# Patient Record
Sex: MALE | Race: WHITE | NOT HISPANIC OR LATINO | Employment: FULL TIME | URBAN - NONMETROPOLITAN AREA
[De-identification: names, ages, dates, MRNs, and addresses within clinical notes are randomized per-mention and may not be internally consistent; named-entity substitution may affect disease eponyms.]

---

## 2021-04-22 ENCOUNTER — IMMUNIZATION (OUTPATIENT)
Dept: VACCINE CLINIC | Facility: HOSPITAL | Age: 21
End: 2021-04-22

## 2021-04-22 PROCEDURE — 91300 HC SARSCOV02 VAC 30MCG/0.3ML IM: CPT | Performed by: INTERNAL MEDICINE

## 2021-04-22 PROCEDURE — 0001A: CPT | Performed by: INTERNAL MEDICINE

## 2021-05-13 ENCOUNTER — IMMUNIZATION (OUTPATIENT)
Dept: VACCINE CLINIC | Facility: HOSPITAL | Age: 21
End: 2021-05-13

## 2021-05-13 PROCEDURE — 91300 HC SARSCOV02 VAC 30MCG/0.3ML IM: CPT | Performed by: INTERNAL MEDICINE

## 2021-05-13 PROCEDURE — 0002A: CPT | Performed by: INTERNAL MEDICINE

## 2023-05-10 ENCOUNTER — OFFICE VISIT (OUTPATIENT)
Dept: FAMILY MEDICINE CLINIC | Facility: CLINIC | Age: 23
End: 2023-05-10
Payer: COMMERCIAL

## 2023-05-10 ENCOUNTER — LAB (OUTPATIENT)
Dept: LAB | Facility: HOSPITAL | Age: 23
End: 2023-05-10
Payer: COMMERCIAL

## 2023-05-10 ENCOUNTER — TELEPHONE (OUTPATIENT)
Dept: FAMILY MEDICINE CLINIC | Facility: CLINIC | Age: 23
End: 2023-05-10

## 2023-05-10 VITALS
SYSTOLIC BLOOD PRESSURE: 120 MMHG | HEIGHT: 71 IN | WEIGHT: 210.2 LBS | DIASTOLIC BLOOD PRESSURE: 82 MMHG | BODY MASS INDEX: 29.43 KG/M2 | OXYGEN SATURATION: 97 % | HEART RATE: 89 BPM

## 2023-05-10 DIAGNOSIS — Z13.1 SCREENING FOR DIABETES MELLITUS: ICD-10-CM

## 2023-05-10 DIAGNOSIS — Z00.00 PHYSICAL EXAM, ANNUAL: ICD-10-CM

## 2023-05-10 DIAGNOSIS — L65.9 HAIR LOSS: ICD-10-CM

## 2023-05-10 DIAGNOSIS — Z13.0 SCREENING FOR DEFICIENCY ANEMIA: ICD-10-CM

## 2023-05-10 DIAGNOSIS — Z11.59 ENCOUNTER FOR HEPATITIS C SCREENING TEST FOR LOW RISK PATIENT: ICD-10-CM

## 2023-05-10 DIAGNOSIS — Z76.89 ENCOUNTER TO ESTABLISH CARE: Primary | ICD-10-CM

## 2023-05-10 DIAGNOSIS — E05.90 HYPERTHYROIDISM: Primary | ICD-10-CM

## 2023-05-10 DIAGNOSIS — Z13.29 SCREENING FOR THYROID DISORDER: ICD-10-CM

## 2023-05-10 DIAGNOSIS — R23.2 HOT FLASHES: ICD-10-CM

## 2023-05-10 DIAGNOSIS — E05.90 HYPERTHYROIDISM: ICD-10-CM

## 2023-05-10 LAB
ALBUMIN SERPL-MCNC: 4.5 G/DL (ref 3.5–5.2)
ALBUMIN/GLOB SERPL: 1.7 G/DL
ALP SERPL-CCNC: 120 U/L (ref 39–117)
ALT SERPL W P-5'-P-CCNC: 27 U/L (ref 1–41)
ANION GAP SERPL CALCULATED.3IONS-SCNC: 9.3 MMOL/L (ref 5–15)
AST SERPL-CCNC: 23 U/L (ref 1–40)
BASOPHILS # BLD AUTO: 0.03 10*3/MM3 (ref 0–0.2)
BASOPHILS NFR BLD AUTO: 0.5 % (ref 0–1.5)
BILIRUB SERPL-MCNC: 0.5 MG/DL (ref 0–1.2)
BUN SERPL-MCNC: 10 MG/DL (ref 6–20)
BUN/CREAT SERPL: 14.3 (ref 7–25)
CALCIUM SPEC-SCNC: 10.4 MG/DL (ref 8.6–10.5)
CHLORIDE SERPL-SCNC: 109 MMOL/L (ref 98–107)
CO2 SERPL-SCNC: 24.7 MMOL/L (ref 22–29)
CREAT SERPL-MCNC: 0.7 MG/DL (ref 0.76–1.27)
DEPRECATED RDW RBC AUTO: 37.2 FL (ref 37–54)
EGFRCR SERPLBLD CKD-EPI 2021: 132 ML/MIN/1.73
EOSINOPHIL # BLD AUTO: 0.33 10*3/MM3 (ref 0–0.4)
EOSINOPHIL NFR BLD AUTO: 5.6 % (ref 0.3–6.2)
ERYTHROCYTE [DISTWIDTH] IN BLOOD BY AUTOMATED COUNT: 13 % (ref 12.3–15.4)
GLOBULIN UR ELPH-MCNC: 2.7 GM/DL
GLUCOSE SERPL-MCNC: 112 MG/DL (ref 65–99)
HCT VFR BLD AUTO: 49.6 % (ref 37.5–51)
HCV AB SER DONR QL: NORMAL
HGB BLD-MCNC: 17.4 G/DL (ref 13–17.7)
IMM GRANULOCYTES # BLD AUTO: 0.01 10*3/MM3 (ref 0–0.05)
IMM GRANULOCYTES NFR BLD AUTO: 0.2 % (ref 0–0.5)
LYMPHOCYTES # BLD AUTO: 2.85 10*3/MM3 (ref 0.7–3.1)
LYMPHOCYTES NFR BLD AUTO: 48.6 % (ref 19.6–45.3)
MCH RBC QN AUTO: 27.8 PG (ref 26.6–33)
MCHC RBC AUTO-ENTMCNC: 35.1 G/DL (ref 31.5–35.7)
MCV RBC AUTO: 79.4 FL (ref 79–97)
MONOCYTES # BLD AUTO: 0.6 10*3/MM3 (ref 0.1–0.9)
MONOCYTES NFR BLD AUTO: 10.2 % (ref 5–12)
NEUTROPHILS NFR BLD AUTO: 2.04 10*3/MM3 (ref 1.7–7)
NEUTROPHILS NFR BLD AUTO: 34.9 % (ref 42.7–76)
NRBC BLD AUTO-RTO: 0 /100 WBC (ref 0–0.2)
PLATELET # BLD AUTO: 313 10*3/MM3 (ref 140–450)
PMV BLD AUTO: 10.2 FL (ref 6–12)
POTASSIUM SERPL-SCNC: 4.8 MMOL/L (ref 3.5–5.2)
PROT SERPL-MCNC: 7.2 G/DL (ref 6–8.5)
RBC # BLD AUTO: 6.25 10*6/MM3 (ref 4.14–5.8)
SODIUM SERPL-SCNC: 143 MMOL/L (ref 136–145)
T3FREE SERPL-MCNC: 16.6 PG/ML (ref 2–4.4)
T4 FREE SERPL-MCNC: 4.8 NG/DL (ref 0.93–1.7)
TSH SERPL DL<=0.05 MIU/L-ACNC: <0.005 UIU/ML (ref 0.27–4.2)
WBC NRBC COR # BLD: 5.86 10*3/MM3 (ref 3.4–10.8)

## 2023-05-10 PROCEDURE — 84481 FREE ASSAY (FT-3): CPT | Performed by: PHYSICIAN ASSISTANT

## 2023-05-10 PROCEDURE — 84439 ASSAY OF FREE THYROXINE: CPT | Performed by: PHYSICIAN ASSISTANT

## 2023-05-10 PROCEDURE — 80050 GENERAL HEALTH PANEL: CPT | Performed by: PHYSICIAN ASSISTANT

## 2023-05-10 PROCEDURE — 36415 COLL VENOUS BLD VENIPUNCTURE: CPT | Performed by: PHYSICIAN ASSISTANT

## 2023-05-10 PROCEDURE — 86803 HEPATITIS C AB TEST: CPT | Performed by: PHYSICIAN ASSISTANT

## 2023-05-10 NOTE — PROGRESS NOTES
Chief Complaint   Patient presents with   • Thyroid Problem     Pt states he has a history of thyroids in his family and he wants to get checked. He states he has had hot flashes for a few years and hair loss past few years.       Brady Roger is a 22 y.o. male who presents to [facility].  Patient has not been to a primary care in years.  He is concerned about his thyroid.  Both of his parents have thyroid disorder.    Patient reports hot flashes daily.  Started a few years ago.  Not necessarily getting worse.  Hair loss.  Sometimes has constipation.  No weight gain/loss.  No depression/anxiety.  Has sweating.  No lack of libido or ED. patient is not taking any medications or supplements.      Chief Complaint   Patient presents with   • Thyroid Problem     Pt states he has a history of thyroids in his family and he wants to get checked. He states he has had hot flashes for a few years and hair loss past few years.       Past Medical History:   Diagnosis Date   • Allergic 1 years old    penicillin   • Visual impairment 8 years old       Past Surgical History:   Procedure Laterality Date   • WISDOM TOOTH EXTRACTION         Family History   Problem Relation Age of Onset   • Thyroid disease Mother    • Thyroid disease Father    • Colon cancer Neg Hx    • Prostate cancer Neg Hx        Social History     Socioeconomic History   • Marital status: Single   Tobacco Use   • Smoking status: Former     Packs/day: 0.25     Years: 3.00     Pack years: 0.75     Types: Cigarettes, Electronic Cigarette     Quit date: 2020     Years since quittin.9   • Smokeless tobacco: Never   Substance and Sexual Activity   • Alcohol use: Yes     Alcohol/week: 2.0 standard drinks     Types: 2 Glasses of wine per week   • Drug use: Never   • Sexual activity: Yes     Partners: Female     Birth control/protection: Birth control pill       Allergies   Allergen Reactions   • Penicillins Hives     PCN family       ROS    Review of  "Systems   Constitutional: Positive for fatigue. Negative for activity change, appetite change, chills, diaphoresis, fever, unexpected weight gain and unexpected weight loss.   HENT: Negative for congestion, dental problem, ear pain, hearing loss, nosebleeds, sinus pressure, sore throat and trouble swallowing.    Eyes: Negative for blurred vision, pain, redness and visual disturbance.   Respiratory: Negative for apnea, cough, chest tightness, shortness of breath and wheezing.    Cardiovascular: Negative for chest pain, palpitations and leg swelling.   Gastrointestinal: Positive for constipation. Negative for abdominal distention, abdominal pain, anal bleeding, blood in stool, diarrhea, nausea, vomiting, GERD and indigestion.   Endocrine: Positive for heat intolerance. Negative for cold intolerance, polydipsia, polyphagia and polyuria.   Genitourinary: Negative for decreased urine volume, difficulty urinating, dysuria, frequency, hematuria, urgency and urinary incontinence.   Musculoskeletal: Negative for gait problem, joint swelling and bursitis.   Skin: Negative for dry skin, rash, skin lesions and wound.   Neurological: Negative for dizziness, tremors, seizures, syncope, speech difficulty, weakness, light-headedness, headache, memory problem and confusion.   Hematological: Does not bruise/bleed easily.   Psychiatric/Behavioral: Positive for stress. Negative for agitation, behavioral problems, decreased concentration, hallucinations, sleep disturbance, suicidal ideas and depressed mood. The patient is not nervous/anxious.        Vitals:    05/10/23 1025   BP: 120/82   Pulse: 89   SpO2: 97%   Weight: 95.3 kg (210 lb 3.2 oz)   Height: 180.3 cm (71\")     Body mass index is 29.32 kg/m².    No current outpatient medications on file prior to visit.     No current facility-administered medications on file prior to visit.       No results found for this or any previous visit.    PE  Physical Exam  Vitals reviewed. "   Constitutional:       General: He is not in acute distress.     Appearance: Normal appearance. He is well-developed and overweight. He is not ill-appearing or diaphoretic.   HENT:      Head: Normocephalic and atraumatic.   Eyes:      Extraocular Movements: Extraocular movements intact.      Conjunctiva/sclera: Conjunctivae normal.   Neck:      Thyroid: No thyroid mass, thyromegaly or thyroid tenderness.   Cardiovascular:      Rate and Rhythm: Normal rate and regular rhythm.      Heart sounds: Normal heart sounds.   Pulmonary:      Effort: No respiratory distress.   Musculoskeletal:         General: Normal range of motion.      Cervical back: Normal range of motion.      Right lower leg: No edema.      Left lower leg: No edema.   Skin:     General: Skin is warm.      Findings: No erythema or rash.   Neurological:      General: No focal deficit present.      Mental Status: He is alert.   Psychiatric:         Attention and Perception: Attention and perception normal. He is attentive.         Mood and Affect: Mood and affect normal.         Speech: Speech normal.         Behavior: Behavior normal. Behavior is cooperative.         Thought Content: Thought content normal.         Cognition and Memory: Cognition and memory normal.         Judgment: Judgment normal.         A/P    Diagnoses and all orders for this visit:    1. Encounter to establish care (Primary)    2. Hot flashes  3. Hair loss  Ongoing for the last several years.   Will check labs.  Patient to keep log of when he is experiencing hot flashes.  Return in 1 month.    4. Physical exam, annual  -     CBC Auto Differential; Future  -     Comprehensive Metabolic Panel; Future  -     TSH Rfx On Abnormal To Free T4; Future  -     Hepatitis C Antibody; Future  -     CBC Auto Differential  -     Comprehensive Metabolic Panel  -     TSH Rfx On Abnormal To Free T4  -     Hepatitis C Antibody    5. Screening for deficiency anemia  -     CBC Auto Differential;  Future  -     CBC Auto Differential    6. Screening for diabetes mellitus  -     Comprehensive Metabolic Panel; Future  -     Comprehensive Metabolic Panel    7. Screening for thyroid disorder  -     TSH Rfx On Abnormal To Free T4; Future  -     TSH Rfx On Abnormal To Free T4    8. Encounter for hepatitis C screening test for low risk patient  -     Hepatitis C Antibody; Future  -     Hepatitis C Antibody    Will order labs today.  Return in 1 month for APE.  Obtain labs prior to appointment.     Plan of care reviewed with patient at the conclusion of today's visit. Education was provided regarding diagnosis, management and any prescribed or recommended OTC medications.  Patient verbalizes understanding of and agreement with management plan.    Dictated Utilizing Dragon Dictation     Please note that portions of this note were completed with a voice recognition program.     Part of this note may be an electronic transcription/translation of spoken language to printed text using the Dragon Dictation System.      Return in about 4 weeks (around 6/7/2023) for Annual physical.     Sofi Candelario PA-C

## 2023-05-10 NOTE — TELEPHONE ENCOUNTER
----- Message from Sofi Candelario PA-C sent at 5/10/2023  4:17 PM EDT -----  Please call patient and let him know that his labs show that he has hyperthyroidism.  This needs to be managed by an endocrinologist.  I have started a referral to endocrinology for the patient.  Someone should reach out to him within the next week to schedule an appointment.  Hub can relay and document.

## 2023-05-12 ENCOUNTER — OFFICE VISIT (OUTPATIENT)
Dept: ENDOCRINOLOGY | Facility: CLINIC | Age: 23
End: 2023-05-12
Payer: COMMERCIAL

## 2023-05-12 VITALS
DIASTOLIC BLOOD PRESSURE: 78 MMHG | HEART RATE: 53 BPM | SYSTOLIC BLOOD PRESSURE: 130 MMHG | WEIGHT: 208 LBS | BODY MASS INDEX: 29.12 KG/M2 | OXYGEN SATURATION: 98 % | HEIGHT: 71 IN

## 2023-05-12 DIAGNOSIS — E05.90 HYPERTHYROIDISM: Primary | ICD-10-CM

## 2023-05-12 PROCEDURE — 84445 ASSAY OF TSI GLOBULIN: CPT | Performed by: INTERNAL MEDICINE

## 2023-05-12 PROCEDURE — 99203 OFFICE O/P NEW LOW 30 MIN: CPT | Performed by: INTERNAL MEDICINE

## 2023-05-12 NOTE — PROGRESS NOTES
"     Office Note      Date: 2023  Patient Name: Brady Roger  MRN: [id number]  : 2000    Chief Complaint   Patient presents with   • Hyperthyroidism       History of Present Illness:   Brady Roger is a 22 y.o. male who presents for Hyperthyroidism    He denies any prior h/o thyroid disease.  He has family h/o thyroid disease.  He has noted sweating and heat intolerance.  He notes some fatigue.  He notes some hair loss.  He notes some constipation occ.  He saw his PCP earlier this week and had labs done.  The TSH was undetectable.  The free T4 and T3 were high.  He denies any biotin use.    Subjective      Patient was born where: [place].  Facial radiation exposure: No.  High iodine intake: No  Family hx of thyroid disease: Yes, describe: parents with hypothyroidism.    Review of Systems:   Review of Systems   Constitutional: Positive for diaphoresis and fatigue.   HENT: Positive for sinus pressure.    Eyes: Positive for itching.   Respiratory: Positive for chest tightness.    Cardiovascular: Negative.    Gastrointestinal: Negative.         Heartburn/Reflux   Endocrine: Positive for polyuria.   Genitourinary: Negative.    Musculoskeletal: Negative.    Skin: Negative.         Hair Loss   Allergic/Immunologic: Negative.    Neurological: Positive for headaches.   Hematological: Negative.    Psychiatric/Behavioral: Negative.        The following portions of the patient's history were reviewed and updated as appropriate: allergies, current medications, past family history, past medical history, past social history, past surgical history and problem list.    Objective     Visit Vitals  /78   Pulse 53   Ht 180.3 cm (71\")   Wt 94.3 kg (208 lb)   SpO2 98%   BMI 29.01 kg/m²       Physical Exam:  Physical Exam  Constitutional:       Appearance: Normal appearance.   HENT:      Head: Normocephalic and atraumatic.   Eyes:      Extraocular Movements: Extraocular movements intact.      Conjunctiva/sclera: " Conjunctivae normal.      Pupils: Pupils are equal, round, and reactive to light.   Neck:      Thyroid: Thyromegaly present. No thyroid mass or thyroid tenderness.      Comments: Thyroid firm and mildly enlarged  Cardiovascular:      Rate and Rhythm: Normal rate and regular rhythm.      Pulses: Normal pulses.      Heart sounds: Normal heart sounds.   Pulmonary:      Effort: Pulmonary effort is normal.      Breath sounds: Normal breath sounds.   Abdominal:      General: Bowel sounds are normal.      Palpations: Abdomen is soft.   Musculoskeletal:         General: Normal range of motion.      Cervical back: Normal range of motion and neck supple.   Lymphadenopathy:      Cervical: No cervical adenopathy.   Skin:     General: Skin is warm and dry.   Neurological:      General: No focal deficit present.      Mental Status: He is alert.   Psychiatric:         Mood and Affect: Mood normal.         Behavior: Behavior normal.         Thought Content: Thought content normal.         Judgment: Judgment normal.         Labs:    TSH  No results found for: TSHBASE     Free T4  Free T4   Date Value Ref Range Status   05/10/2023 4.80 (H) 0.93 - 1.70 ng/dL Final       T3  No results found for: D2OPMQJ      TPO  No results found for: THYROIDAB    TG AB  No results found for: THGAB    TG  No results found for: THYROGLB    CBC w/DIFF  Lab Results   Component Value Date    WBC 5.86 05/10/2023    RBC 6.25 (H) 05/10/2023    HGB 17.4 05/10/2023    HCT 49.6 05/10/2023    MCV 79.4 05/10/2023    MCH 27.8 05/10/2023    MCHC 35.1 05/10/2023    RDW 13.0 05/10/2023    RDWSD 37.2 05/10/2023    MPV 10.2 05/10/2023     05/10/2023    NEUTRORELPCT 34.9 (L) 05/10/2023    LYMPHORELPCT 48.6 (H) 05/10/2023    MONORELPCT 10.2 05/10/2023    EOSRELPCT 5.6 05/10/2023    BASORELPCT 0.5 05/10/2023    AUTOIGPER 0.2 05/10/2023    NEUTROABS 2.04 05/10/2023    LYMPHSABS 2.85 05/10/2023    MONOSABS 0.60 05/10/2023    EOSABS 0.33 05/10/2023    BASOSABS 0.03  05/10/2023    AUTOIGNUM 0.01 05/10/2023    NRBC 0.0 05/10/2023           Assessment / Plan      Assessment & Plan:  Diagnoses and all orders for this visit:    1. Hyperthyroidism (Primary)  Assessment & Plan:  Labs are c/w hyperthyroidism.  Small goiter on exam.  No eye findings.  He isn't particularly symptomatic.  Is this Graves' disease?  I would like to check TSI today.  We discussed possible treatment with methimazole.  Potential s/e discussed.  No indication for betablocker at this time.    Orders:  -     Thyroid Stimulating Immunoglobulin; Future     No current outpatient medications   Return in about 3 months (around 8/12/2023) for Recheck with TSH, free T4, CMP, CBC.    Hong Ariza MD   05/12/2023

## 2023-05-12 NOTE — ASSESSMENT & PLAN NOTE
Labs are c/w hyperthyroidism.  Small goiter on exam.  No eye findings.  He isn't particularly symptomatic.  Is this Graves' disease?  I would like to check TSI today.  We discussed possible treatment with methimazole.  Potential s/e discussed.  No indication for betablocker at this time.

## 2023-05-14 LAB — TSI SER-ACNC: 15.7 IU/L (ref 0–0.55)

## 2023-05-14 RX ORDER — METHIMAZOLE 10 MG/1
30 TABLET ORAL DAILY
Qty: 270 TABLET | Refills: 1 | Status: SHIPPED | OUTPATIENT
Start: 2023-05-14

## 2023-11-13 RX ORDER — METHIMAZOLE 10 MG/1
30 TABLET ORAL DAILY
Qty: 90 TABLET | Refills: 0 | Status: SHIPPED | OUTPATIENT
Start: 2023-11-13

## 2023-11-13 NOTE — TELEPHONE ENCOUNTER
Rx Refill Note    Requested Prescriptions     Pending Prescriptions Disp Refills    methIMAzole (TAPAZOLE) 10 MG tablet [Pharmacy Med Name: METHIMAZOLE 10 MG TABLET] 90 tablet      Sig: TAKE THREE TABLETS BY MOUTH DAILY        Last office visit with prescribing clinician: 5/12/2023       Next office visit with prescribing clinician: 11/28/2023     {    Bria Sesay MA  11/13/23, 09:01 EST

## 2023-11-28 ENCOUNTER — OFFICE VISIT (OUTPATIENT)
Dept: ENDOCRINOLOGY | Facility: CLINIC | Age: 23
End: 2023-11-28
Payer: COMMERCIAL

## 2023-11-28 VITALS
SYSTOLIC BLOOD PRESSURE: 126 MMHG | DIASTOLIC BLOOD PRESSURE: 78 MMHG | HEART RATE: 84 BPM | OXYGEN SATURATION: 98 % | WEIGHT: 265 LBS | BODY MASS INDEX: 37.1 KG/M2 | HEIGHT: 71 IN

## 2023-11-28 DIAGNOSIS — E05.90 HYPERTHYROIDISM: Primary | ICD-10-CM

## 2023-11-28 LAB
ALBUMIN SERPL-MCNC: 4.7 G/DL (ref 3.5–5.2)
ALBUMIN/GLOB SERPL: 2 G/DL
ALP SERPL-CCNC: 87 U/L (ref 39–117)
ALT SERPL W P-5'-P-CCNC: 37 U/L (ref 1–41)
ANION GAP SERPL CALCULATED.3IONS-SCNC: 10.6 MMOL/L (ref 5–15)
AST SERPL-CCNC: 34 U/L (ref 1–40)
BILIRUB SERPL-MCNC: 0.4 MG/DL (ref 0–1.2)
BUN SERPL-MCNC: 12 MG/DL (ref 6–20)
BUN/CREAT SERPL: 10.3 (ref 7–25)
CALCIUM SPEC-SCNC: 9.2 MG/DL (ref 8.6–10.5)
CHLORIDE SERPL-SCNC: 102 MMOL/L (ref 98–107)
CO2 SERPL-SCNC: 25.4 MMOL/L (ref 22–29)
CREAT SERPL-MCNC: 1.16 MG/DL (ref 0.76–1.27)
DEPRECATED RDW RBC AUTO: 42.8 FL (ref 37–54)
EGFRCR SERPLBLD CKD-EPI 2021: 89.6 ML/MIN/1.73
ERYTHROCYTE [DISTWIDTH] IN BLOOD BY AUTOMATED COUNT: 13.7 % (ref 12.3–15.4)
GLOBULIN UR ELPH-MCNC: 2.3 GM/DL
GLUCOSE SERPL-MCNC: 99 MG/DL (ref 65–99)
HCT VFR BLD AUTO: 46.3 % (ref 37.5–51)
HGB BLD-MCNC: 15.4 G/DL (ref 13–17.7)
MCH RBC QN AUTO: 28.7 PG (ref 26.6–33)
MCHC RBC AUTO-ENTMCNC: 33.3 G/DL (ref 31.5–35.7)
MCV RBC AUTO: 86.4 FL (ref 79–97)
PLATELET # BLD AUTO: 305 10*3/MM3 (ref 140–450)
PMV BLD AUTO: 10.3 FL (ref 6–12)
POTASSIUM SERPL-SCNC: 4.1 MMOL/L (ref 3.5–5.2)
PROT SERPL-MCNC: 7 G/DL (ref 6–8.5)
RBC # BLD AUTO: 5.36 10*6/MM3 (ref 4.14–5.8)
SODIUM SERPL-SCNC: 138 MMOL/L (ref 136–145)
T4 FREE SERPL-MCNC: 0.29 NG/DL (ref 0.93–1.7)
TSH SERPL DL<=0.05 MIU/L-ACNC: 46.6 UIU/ML (ref 0.27–4.2)
WBC NRBC COR # BLD AUTO: 6.78 10*3/MM3 (ref 3.4–10.8)

## 2023-11-28 PROCEDURE — 85027 COMPLETE CBC AUTOMATED: CPT | Performed by: INTERNAL MEDICINE

## 2023-11-28 PROCEDURE — 80053 COMPREHEN METABOLIC PANEL: CPT | Performed by: INTERNAL MEDICINE

## 2023-11-28 PROCEDURE — 99213 OFFICE O/P EST LOW 20 MIN: CPT | Performed by: INTERNAL MEDICINE

## 2023-11-28 PROCEDURE — 36415 COLL VENOUS BLD VENIPUNCTURE: CPT | Performed by: INTERNAL MEDICINE

## 2023-11-28 PROCEDURE — 84439 ASSAY OF FREE THYROXINE: CPT | Performed by: INTERNAL MEDICINE

## 2023-11-28 PROCEDURE — 84443 ASSAY THYROID STIM HORMONE: CPT | Performed by: INTERNAL MEDICINE

## 2023-11-28 RX ORDER — METHIMAZOLE 10 MG/1
10 TABLET ORAL DAILY
Qty: 90 TABLET | Refills: 3 | Status: SHIPPED | OUTPATIENT
Start: 2023-11-28

## 2023-11-28 NOTE — PROGRESS NOTES
"     Office Note      Date: 2023  Patient Name: Brady Roger  MRN: [id number]  : 2000    Chief Complaint   Patient presents with    Hyperthyroidism       History of Present Illness:   Brady Roger is a 23 y.o. male who presents for Hyperthyroidism    At his last visit 6 months ago, labs confirmed the diagnosis of Graves' disease.  We started methimazole 30mg qd.  He has cancelled a couple of follow up appointments since then.  He is tolerating this okay.  He hasn't noted any change in the size of his neck.  He denies any compressive sxs.  He has gained weight.  He denies any palpitations.  He notes resolution of hot flashes.      Subjective      Review of Systems:   Review of Systems   Constitutional: Negative.    Cardiovascular: Negative.    Gastrointestinal: Negative.    Endocrine: Negative.        The following portions of the patient's history were reviewed and updated as appropriate: allergies, current medications, past family history, past medical history, past social history, past surgical history, and problem list.    Objective     Visit Vitals  /78   Pulse 84   Ht 180.3 cm (71\")   Wt 120 kg (265 lb)   SpO2 98%   BMI 36.96 kg/m²       Physical Exam:  Physical Exam  Constitutional:       Appearance: Normal appearance.   Neck:      Thyroid: Thyromegaly present. No thyroid mass or thyroid tenderness.      Comments: Thyroid firm and enlarged 2x normal size.  Lymphadenopathy:      Cervical: No cervical adenopathy.   Neurological:      Mental Status: He is alert.         Labs:    TSH  No results found for: \"TSHBASE\"     Free T4  Free T4   Date Value Ref Range Status   05/10/2023 4.80 (H) 0.93 - 1.70 ng/dL Final       T3  No results found for: \"P5VIHVA\"      TPO  No results found for: \"THYROIDAB\"    TG AB  No results found for: \"THGAB\"    TG  No results found for: \"THYROGLB\"    CBC w/DIFF  Lab Results   Component Value Date    WBC 5.86 05/10/2023    RBC 6.25 (H) 05/10/2023    HGB 17.4 " 05/10/2023    HCT 49.6 05/10/2023    MCV 79.4 05/10/2023    MCH 27.8 05/10/2023    MCHC 35.1 05/10/2023    RDW 13.0 05/10/2023    RDWSD 37.2 05/10/2023    MPV 10.2 05/10/2023     05/10/2023    NEUTRORELPCT 34.9 (L) 05/10/2023    LYMPHORELPCT 48.6 (H) 05/10/2023    MONORELPCT 10.2 05/10/2023    EOSRELPCT 5.6 05/10/2023    BASORELPCT 0.5 05/10/2023    AUTOIGPER 0.2 05/10/2023    NEUTROABS 2.04 05/10/2023    LYMPHSABS 2.85 05/10/2023    MONOSABS 0.60 05/10/2023    EOSABS 0.33 05/10/2023    BASOSABS 0.03 05/10/2023    AUTOIGNUM 0.01 05/10/2023    NRBC 0.0 05/10/2023           Assessment / Plan      Assessment & Plan:  Diagnoses and all orders for this visit:    1. Hyperthyroidism (Primary)  Assessment & Plan:  Labs were c/w Graves' disease.  He has tolerated methimazole but has gained a lot of weight.  Check labs today.  Will send note about results.  We discussed possibility of remission of Graves' disease.    Orders:  -     TSH; Future  -     T4, Free; Future  -     Comprehensive Metabolic Panel; Future  -     CBC (No Diff); Future      Current Outpatient Medications   Medication Instructions    methIMAzole (TAPAZOLE) 30 mg, Oral, Daily      Return in about 3 months (around 2/28/2024) for Recheck with TSH, free T4.    Hong Ariza MD   11/28/2023

## 2023-11-28 NOTE — ASSESSMENT & PLAN NOTE
Labs were c/w Graves' disease.  He has tolerated methimazole but has gained a lot of weight.  Check labs today.  Will send note about results.  We discussed possibility of remission of Graves' disease.

## 2024-01-11 RX ORDER — METHIMAZOLE 10 MG/1
30 TABLET ORAL DAILY
Qty: 90 TABLET | Refills: 3 | Status: SHIPPED | OUTPATIENT
Start: 2024-01-11

## 2024-01-11 NOTE — TELEPHONE ENCOUNTER
Rx Refill Note  Requested Prescriptions     Pending Prescriptions Disp Refills    methIMAzole (TAPAZOLE) 10 MG tablet [Pharmacy Med Name: METHIMAZOLE 10 MG TABLET] 90 tablet 3     Sig: TAKE THREE TABLETS BY MOUTH DAILY      Last office visit with prescribing clinician: 11/28/2023   Last telemedicine visit with prescribing clinician: Visit date not found   Next office visit with prescribing clinician: 4/3/2024                         Would you like a call back once the refill request has been completed: [] Yes [] No    If the office needs to give you a call back, can they leave a voicemail: [] Yes [] No    Isabella Reilly MA  01/11/24, 10:21 EST

## 2024-10-11 ENCOUNTER — OFFICE VISIT (OUTPATIENT)
Dept: ENDOCRINOLOGY | Facility: CLINIC | Age: 24
End: 2024-10-11
Payer: COMMERCIAL

## 2024-10-11 VITALS
HEIGHT: 71 IN | HEART RATE: 108 BPM | WEIGHT: 245 LBS | SYSTOLIC BLOOD PRESSURE: 118 MMHG | BODY MASS INDEX: 34.3 KG/M2 | OXYGEN SATURATION: 98 % | DIASTOLIC BLOOD PRESSURE: 75 MMHG

## 2024-10-11 DIAGNOSIS — E05.90 HYPERTHYROIDISM: Primary | ICD-10-CM

## 2024-10-11 LAB
T4 FREE SERPL-MCNC: 2.25 NG/DL (ref 0.92–1.68)
TSH SERPL DL<=0.05 MIU/L-ACNC: <0.005 UIU/ML (ref 0.27–4.2)

## 2024-10-11 PROCEDURE — 99213 OFFICE O/P EST LOW 20 MIN: CPT | Performed by: INTERNAL MEDICINE

## 2024-10-11 PROCEDURE — 84439 ASSAY OF FREE THYROXINE: CPT | Performed by: INTERNAL MEDICINE

## 2024-10-11 PROCEDURE — 84443 ASSAY THYROID STIM HORMONE: CPT | Performed by: INTERNAL MEDICINE

## 2024-10-11 PROCEDURE — 36415 COLL VENOUS BLD VENIPUNCTURE: CPT | Performed by: INTERNAL MEDICINE

## 2024-10-11 NOTE — PROGRESS NOTES
"     Office Note      Date: 10/11/2024  Patient Name: Brady Roger  MRN: [id number]  : 2000    Chief Complaint   Patient presents with    Hyperthyroidism       History of Present Illness:   Brady Roger is a 23 y.o. male who presents for Hyperthyroidism    He was diagnosed with Graves' disease 2023.  He was on methimazole 10mg qd. He noted some weight gain with it.  He stopped the methimazole about 8 months. He hasn't noted any change in the size of his neck. He denies any compressive sxs. He has lost some weight. His energy level is good.  He notes rare palpitations. He denies any eye complaints.    Subjective      Review of Systems:   Review of Systems   Constitutional: Negative.    Cardiovascular: Negative.    Gastrointestinal: Negative.    Endocrine: Negative.        The following portions of the patient's history were reviewed and updated as appropriate: allergies, current medications, past family history, past medical history, past social history, past surgical history, and problem list.    Objective     Visit Vitals  /75   Pulse 108   Ht 180.3 cm (71\")   Wt 111 kg (245 lb)   SpO2 98%   BMI 34.17 kg/m²       Physical Exam:  Physical Exam  Constitutional:       Appearance: Normal appearance.   Neck:      Thyroid: Thyromegaly present. No thyroid mass or thyroid tenderness.      Comments: Thyroid mildly enlarged  Lymphadenopathy:      Cervical: No cervical adenopathy.   Neurological:      Mental Status: He is alert.         Labs:    TSH  No results found for: \"TSHBASE\"     Free T4  Free T4   Date Value Ref Range Status   2023 0.29 (L) 0.93 - 1.70 ng/dL Final       T3  No results found for: \"B7GYHIT\"      TPO  No results found for: \"THYROIDAB\"    TG AB  No results found for: \"THGAB\"    TG  No results found for: \"THYROGLB\"    CBC w/DIFF  Lab Results   Component Value Date    WBC 6.78 2023    RBC 5.36 2023    HGB 15.4 2023    HCT 46.3 2023    MCV 86.4 2023 "    MCH 28.7 11/28/2023    MCHC 33.3 11/28/2023    RDW 13.7 11/28/2023    RDWSD 42.8 11/28/2023    MPV 10.3 11/28/2023     11/28/2023    NEUTRORELPCT 34.9 (L) 05/10/2023    LYMPHORELPCT 48.6 (H) 05/10/2023    MONORELPCT 10.2 05/10/2023    EOSRELPCT 5.6 05/10/2023    BASORELPCT 0.5 05/10/2023    AUTOIGPER 0.2 05/10/2023    NEUTROABS 2.04 05/10/2023    LYMPHSABS 2.85 05/10/2023    MONOSABS 0.60 05/10/2023    EOSABS 0.33 05/10/2023    BASOSABS 0.03 05/10/2023    AUTOIGNUM 0.01 05/10/2023    NRBC 0.0 05/10/2023           Assessment / Plan      Assessment & Plan:  Diagnoses and all orders for this visit:    1. Hyperthyroidism (Primary)  Assessment & Plan:  He has h/o Graves' disease.  He has been off methimazole for about 8 months.  He is clinically euthyroid on exam today.  No eye findings.  Check labs today.  Will send note about results.    Orders:  -     TSH; Future  -     T4, Free; Future      No current outpatient medications     Return in about 3 months (around 1/11/2025) for Recheck with TSH, free T4 - schedule with PA.    Electronically signed by: Hong Ariza MD  10/11/2024

## 2024-10-11 NOTE — ASSESSMENT & PLAN NOTE
He has h/o Graves' disease.  He has been off methimazole for about 8 months.  He is clinically euthyroid on exam today.  No eye findings.  Check labs today.  Will send note about results.

## 2024-10-13 RX ORDER — METHIMAZOLE 5 MG/1
5 TABLET ORAL DAILY
Qty: 90 TABLET | Refills: 3 | Status: SHIPPED | OUTPATIENT
Start: 2024-10-13

## 2025-01-16 ENCOUNTER — OFFICE VISIT (OUTPATIENT)
Dept: ENDOCRINOLOGY | Facility: CLINIC | Age: 25
End: 2025-01-16
Payer: COMMERCIAL

## 2025-01-16 VITALS
WEIGHT: 251 LBS | HEIGHT: 71 IN | SYSTOLIC BLOOD PRESSURE: 126 MMHG | HEART RATE: 92 BPM | OXYGEN SATURATION: 98 % | DIASTOLIC BLOOD PRESSURE: 78 MMHG | BODY MASS INDEX: 35.14 KG/M2

## 2025-01-16 DIAGNOSIS — E05.90 HYPERTHYROIDISM: Primary | ICD-10-CM

## 2025-01-16 PROCEDURE — 99213 OFFICE O/P EST LOW 20 MIN: CPT | Performed by: PHYSICIAN ASSISTANT

## 2025-01-16 RX ORDER — METHIMAZOLE 5 MG/1
5 TABLET ORAL DAILY
Qty: 30 TABLET | Refills: 1 | Status: SHIPPED | OUTPATIENT
Start: 2025-01-16

## 2025-01-16 NOTE — PROGRESS NOTES
"     Office Note      Date: 2025  Patient Name: Brady Roger  MRN: [id number]  : 2000    Chief Complaint   Patient presents with    Thyroid Problem     Hyperthyroidism         History of Present Illness:   Brady Roger is a 24 y.o. male who presents for Thyroid Problem (Hyperthyroidism/)  .   Current rx: methimazole 5 mg    Ran out of medication about a week ago.    He was diagnosed with Graves' disease 2023.  He was on methimazole 10mg qd. He noted some weight gain with it.  He stopped the methimazole for about 8 months. Thyroid levels were in hyperthyroid rand and hew as restarted on methimazole 5 mg. Has been back on methimazole 5 mg for the past 3 months. He is feeling better on this dose. No cold intolerance, no hair loss or constipation. No longer feeling heat intolerance that he was feeling before. He hasn't noted any change in the size of his neck. He denies any compressive sxs. Weight is stable. His energy level is good.  He notes rare palpitations. He denies any eye complaints.           Subjective          Review of Systems:   Review of Systems   Constitutional:  Negative for diaphoresis and fatigue.   HENT:  Negative for trouble swallowing.    Cardiovascular:  Negative for palpitations.   Gastrointestinal:  Negative for constipation and diarrhea.   Endocrine: Negative for cold intolerance and heat intolerance.   Musculoskeletal:  Negative for arthralgias and myalgias.   Psychiatric/Behavioral:  The patient is not nervous/anxious.        The following portions of the patient's history were reviewed and updated as appropriate: allergies, current medications, past family history, past medical history, past social history, past surgical history, and problem list.    Objective     Visit Vitals  /78 (BP Location: Right arm, Patient Position: Sitting, Cuff Size: Adult)   Pulse 92   Ht 180.3 cm (71\")   Wt 114 kg (251 lb)   SpO2 98%   BMI 35.01 kg/m²           Physical Exam:  Physical " Exam  Constitutional:       Appearance: He is well-developed.   HENT:      Head: Normocephalic and atraumatic.      Right Ear: External ear normal.      Left Ear: External ear normal.   Eyes:      Conjunctiva/sclera: Conjunctivae normal.   Neck:      Thyroid: Thyromegaly (mildly enlarged, firm) present. No thyroid mass or thyroid tenderness.   Cardiovascular:      Rate and Rhythm: Normal rate and regular rhythm.   Pulmonary:      Effort: Pulmonary effort is normal.      Breath sounds: Normal breath sounds.   Musculoskeletal:         General: Normal range of motion.      Cervical back: Normal range of motion.   Skin:     General: Skin is warm and dry.   Psychiatric:         Behavior: Behavior normal.         Assessment / Plan      Assessment & Plan:  Problem List Items Addressed This Visit          Endocrine and Metabolic    Hyperthyroidism - Primary    Current Assessment & Plan     Patient has been clinically euthyroid on methimazole 5 mg daily.  He has been off the medication for about a week so we will have him restart daily dosing and check thyroid labs in 3 to 4 weeks.  Will make further recommendations regarding dose adjustments or lab recheck at that time.         Relevant Medications    methIMAzole (TAPAZOLE) 5 MG tablet    Other Relevant Orders    T4, Free    TSH          Portions of this note were completed with voice recognition program.  Camilla Hernández PA-C  [facility] [place]  01/16/2025

## 2025-01-16 NOTE — ASSESSMENT & PLAN NOTE
Patient has been clinically euthyroid on methimazole 5 mg daily.  He has been off the medication for about a week so we will have him restart daily dosing and check thyroid labs in 3 to 4 weeks.  Will make further recommendations regarding dose adjustments or lab recheck at that time.